# Patient Record
Sex: MALE | Race: WHITE | NOT HISPANIC OR LATINO | Employment: FULL TIME | URBAN - METROPOLITAN AREA
[De-identification: names, ages, dates, MRNs, and addresses within clinical notes are randomized per-mention and may not be internally consistent; named-entity substitution may affect disease eponyms.]

---

## 2020-12-02 NOTE — TELEPHONE ENCOUNTER
Pt called needing a refill on Novolog 100 units/m/L vials use as directed in insulin pump up to a max daily dose of 85 units daily per centricity. Pt last seen 11/26/19 pt next appt 01/20/21

## 2020-12-09 ENCOUNTER — TELEPHONE (OUTPATIENT)
Dept: ENDOCRINOLOGY | Facility: CLINIC | Age: 40
End: 2020-12-09

## 2020-12-09 NOTE — TELEPHONE ENCOUNTER
Pt has 3 days left  He needs a pa for his novolog pt uses [facility] in [place]    He called the pharmacy and they told him if we called it would be approved right away    Please call pt if you have questions

## 2020-12-29 ENCOUNTER — TELEPHONE (OUTPATIENT)
Dept: ENDOCRINOLOGY | Facility: CLINIC | Age: 40
End: 2020-12-29

## 2020-12-29 NOTE — TELEPHONE ENCOUNTER
PT CALLED REQUESTING WE MAIL HIM A LETTER STATING HE IS SEEN BY US FOR DIABETES. PLEASE WRITE LETTER AND MAIL TO PT AT EARLIEST CONVENIENCE. THANK YOU

## 2021-01-06 RX ORDER — LISINOPRIL 10 MG/1
TABLET ORAL
Qty: 90 TABLET | Refills: 3 | Status: SHIPPED | OUTPATIENT
Start: 2021-01-06 | End: 2022-01-03

## 2021-01-20 ENCOUNTER — OFFICE VISIT (OUTPATIENT)
Dept: ENDOCRINOLOGY | Facility: CLINIC | Age: 41
End: 2021-01-20

## 2021-01-20 VITALS
HEIGHT: 73 IN | SYSTOLIC BLOOD PRESSURE: 124 MMHG | DIASTOLIC BLOOD PRESSURE: 74 MMHG | WEIGHT: 183 LBS | BODY MASS INDEX: 24.25 KG/M2

## 2021-01-20 DIAGNOSIS — E10.649 TYPE 1 DIABETES MELLITUS WITH HYPOGLYCEMIA AND WITHOUT COMA (HCC): Primary | Chronic | ICD-10-CM

## 2021-01-20 DIAGNOSIS — Z96.41 PRESENCE OF INSULIN PUMP: ICD-10-CM

## 2021-01-20 DIAGNOSIS — I10 BENIGN ESSENTIAL HYPERTENSION: Chronic | ICD-10-CM

## 2021-01-20 PROCEDURE — 99442 PR PHYS/QHP TELEPHONE EVALUATION 11-20 MIN: CPT | Performed by: PHYSICIAN ASSISTANT

## 2021-01-20 RX ORDER — PROCHLORPERAZINE 25 MG/1
SUPPOSITORY RECTAL
Qty: 9 EACH | Refills: 3 | Status: SHIPPED | OUTPATIENT
Start: 2021-01-20 | End: 2022-01-14

## 2021-01-20 RX ORDER — PROCHLORPERAZINE 25 MG/1
SUPPOSITORY RECTAL
COMMUNITY
End: 2021-01-20 | Stop reason: SDUPTHER

## 2021-01-20 RX ORDER — INSULIN PUMP CONTROLLER
EACH MISCELLANEOUS
COMMUNITY
Start: 2020-11-23 | End: 2021-01-20 | Stop reason: SDUPTHER

## 2021-01-20 RX ORDER — PROCHLORPERAZINE 25 MG/1
1 SUPPOSITORY RECTAL
Qty: 1 EACH | Refills: 3 | Status: SHIPPED | OUTPATIENT
Start: 2021-01-20 | End: 2022-03-21

## 2021-01-20 RX ORDER — FLUTICASONE PROPIONATE 50 MCG
SPRAY, SUSPENSION (ML) NASAL
COMMUNITY

## 2021-01-20 RX ORDER — CETIRIZINE HYDROCHLORIDE 10 MG/1
TABLET ORAL
COMMUNITY

## 2021-01-20 RX ORDER — INSULIN PUMP CONTROLLER
1 EACH MISCELLANEOUS
Qty: 30 EACH | Refills: 3 | Status: SHIPPED | OUTPATIENT
Start: 2021-01-20 | End: 2021-12-07

## 2021-01-20 RX ORDER — INSULIN PUMP CONTROLLER
EACH MISCELLANEOUS
COMMUNITY
End: 2021-01-20

## 2021-01-20 NOTE — PROGRESS NOTES
"     Office Note      Date: 2021  Patient Name: Jonas Santiago  MRN: [id number]  : 1980    Chief Complaint   Patient presents with   • Diabetes     You have chosen to receive care through a telephone visit. Do you consent to use a telephone visit for your medical care today? Yes    History of Present Illness:   Jonas Santiago is a 40 y.o. male who presents today for type 1 diabetes.  He remains on Omnipod Dash insulin pump and using the Dexcom G6.  He is monitoring glucose 288 times per day with CGM.  He denies frequent or severe hypoglycemia.  He reports that blood sugars seem to have been okay overall.  He denies any problems with his feet.  Eye exam due.  He remains on ACE inhibitor.    Subjective        The following portions of the patient's history were reviewed and updated as appropriate: allergies, current medications, past family history, past medical history, past social history, past surgical history and problem list.    Objective     Vitals:    21 1334   BP: 124/74   BP Location: Right arm   Patient Position: Sitting   Cuff Size: Adult   Weight: 83 kg (183 lb)   Height: 185.4 cm (73\")     Body mass index is 24.14 kg/m².    Physical Exam      Current Outpatient Medications   Medication Instructions   • cetirizine (zyrTEC) 10 MG tablet cetirizine 10 mg tablet   Take 1 tablet every day by oral route.   • Continuous Blood Gluc Sensor (Dexcom G6 Sensor) Change sensor every 10 days; E10.9   • Continuous Blood Gluc Transmit (Dexcom G6 Transmitter) misc 1 Device, Does not apply, Every 3 Months, Dx: E10.9   • fluticasone (FLONASE) 50 MCG/ACT nasal spray fluticasone propionate 50 mcg/actuation nasal spray,suspension   Spray 2 sprays every day by intranasal route.   • insulin aspart (NovoLOG) 100 UNIT/ML injection Use as directed in insulin pump daily.  Max daily dose 85 units   • Insulin Disposable Pump (OmniPod Dash 5 Pack Pods) misc 1 Device, Does not apply, Every 3 Days, Dx: E10.9   • lisinopril " (PRINIVIL,ZESTRIL) 10 MG tablet Take 1 tablet daily       Assessment / Plan      Assessment & Plan:  1. Type 1 diabetes mellitus with hypoglycemia and without coma (CMS/[facility])  Reviewed CGM data and discussed with patient.  Sensor average 151 for the past 2 weeks, 77% time in range, <1% low.  Some hyperglycemia in the early morning hours.  Some of these recently have been rebound from mild hypoglycemia around 10 or 11 PM.  Careful with correction boluses at night.  No changes to pump settings at this time.  Lab order mailed - will notify him of results.  - Comprehensive Metabolic Panel; Future  - Lipid Panel; Future  - Hemoglobin A1c; Future  - Microalbumin / Creatinine Urine Ratio - Urine, Clean Catch; Future  - TSH; Future    2. Benign essential hypertension  Home BP monitoring okay.  Continue lisinopril.    3. Presence of insulin pump      20 minutes spent on phone with patient.  Return in about 3 months (around 4/20/2021) for Next scheduled follow up.     CJ Gibbons  01/20/2021

## 2021-03-17 ENCOUNTER — PATIENT MESSAGE (OUTPATIENT)
Dept: ENDOCRINOLOGY | Facility: CLINIC | Age: 41
End: 2021-03-17

## 2021-03-30 DIAGNOSIS — E10.649 TYPE 1 DIABETES MELLITUS WITH HYPOGLYCEMIA AND WITHOUT COMA (HCC): Chronic | ICD-10-CM

## 2021-06-08 ENCOUNTER — OFFICE VISIT (OUTPATIENT)
Dept: ENDOCRINOLOGY | Facility: CLINIC | Age: 41
End: 2021-06-08

## 2021-06-08 VITALS
OXYGEN SATURATION: 99 % | HEIGHT: 73 IN | WEIGHT: 183 LBS | SYSTOLIC BLOOD PRESSURE: 115 MMHG | BODY MASS INDEX: 24.25 KG/M2 | DIASTOLIC BLOOD PRESSURE: 80 MMHG | HEART RATE: 70 BPM

## 2021-06-08 DIAGNOSIS — E10.65 TYPE 1 DIABETES MELLITUS WITH HYPERGLYCEMIA (HCC): Primary | Chronic | ICD-10-CM

## 2021-06-08 DIAGNOSIS — B35.3 TINEA PEDIS, UNSPECIFIED LATERALITY: ICD-10-CM

## 2021-06-08 DIAGNOSIS — Z96.41 PRESENCE OF INSULIN PUMP: ICD-10-CM

## 2021-06-08 DIAGNOSIS — R59.0 LYMPHADENOPATHY OF HEAD AND NECK REGION: ICD-10-CM

## 2021-06-08 LAB
EXPIRATION DATE: NORMAL
HBA1C MFR BLD: 6.8 %
Lab: NORMAL

## 2021-06-08 PROCEDURE — 83036 HEMOGLOBIN GLYCOSYLATED A1C: CPT | Performed by: PHYSICIAN ASSISTANT

## 2021-06-08 PROCEDURE — 99214 OFFICE O/P EST MOD 30 MIN: CPT | Performed by: PHYSICIAN ASSISTANT

## 2021-06-08 PROCEDURE — 95251 CONT GLUC MNTR ANALYSIS I&R: CPT | Performed by: PHYSICIAN ASSISTANT

## 2021-06-08 NOTE — PROGRESS NOTES
"     Office Note      Date: 2021  Patient Name: Jonas Santiago  MRN: [id number]  : 1980    Chief Complaint   Patient presents with   • Diabetes       History of Present Illness:   Jonas Santiago is a 41 y.o. male who presents today for type 1 diabetes.  He remains on OmniPod insulin pump and using the Dexcom G6 CGM.  He is frequently adjusting insulin based on glucose readings.  He notes some hypoglycemia.  He denies any severe hypoglycemia.  Feet- no sores or problems.  Eye exam 2 weeks ago - no retinopathy.  He remains on ACE inhibitor.  Annual labs up to date.  Lipids at goal, renal function and urine protein normal - 2021.  He reports persistent lymph node on right side of neck.  He reports having evaluation with PCP.  He first noticed the enlarged lymph node after first dose of Covid-19 vaccine in January.  Decreased in size, then increased again in February after second dose of vaccine.  He reports that the LN has decreased in size, but he can still feel it.  He reports having normal chest x-ray ordered by PCP.      Subjective      Review of Systems:   Review of Systems   Constitutional: Negative.    Cardiovascular: Negative.    Gastrointestinal: Positive for constipation.   Endocrine: Positive for polyuria.   Hematological: Positive for adenopathy (right side of neck).       The following portions of the patient's history were reviewed and updated as appropriate: allergies, current medications, past family history, past medical history, past social history, past surgical history and problem list.    Objective     Vitals:    21 0917   BP: 115/80   BP Location: Left arm   Patient Position: Sitting   Cuff Size: Adult   Pulse: 70   SpO2: 99%   Weight: 83 kg (183 lb)   Height: 185.4 cm (73\")   PainSc: 0-No pain     Body mass index is 24.14 kg/m².    Physical Exam  Vitals reviewed.   Constitutional:       General: He is not in acute distress.  Neck:      Thyroid: No thyroid mass or thyromegaly. "   Cardiovascular:      Pulses:           Dorsalis pedis pulses are 2+ on the right side and 2+ on the left side.        Posterior tibial pulses are 2+ on the right side and 2+ on the left side.   Musculoskeletal:      Right foot: No deformity.      Left foot: No deformity.   Feet:      Right foot:      Protective Sensation: 10 sites tested. 10 sites sensed.      Skin integrity: Dry skin (right 1st toe, ?tinea pedis) present.      Toenail Condition: Right toenails are normal.      Left foot:      Protective Sensation: 10 sites tested. 10 sites sensed.      Skin integrity: Skin integrity normal.      Toenail Condition: Left toenails are normal.      Comments: Diabetic Foot Exam Performed  Lymphadenopathy:      Cervical: No cervical adenopathy.      Upper Body:      Right upper body: Supraclavicular adenopathy (small shotty node) present.   Neurological:      Mental Status: He is alert and oriented to person, place, and time.   Psychiatric:         Mood and Affect: Affect normal.         HEMOGLOBIN A1C  Lab Results   Component Value Date    HGBA1C 6.8 06/08/2021       Current Outpatient Medications   Medication Instructions   • cetirizine (zyrTEC) 10 MG tablet cetirizine 10 mg tablet   Take 1 tablet every day by oral route.   • Continuous Blood Gluc Sensor (Dexcom G6 Sensor) Change sensor every 10 days; E10.9   • Continuous Blood Gluc Transmit (Dexcom G6 Transmitter) misc 1 Device, Does not apply, Every 3 Months, Dx: E10.9   • fluticasone (FLONASE) 50 MCG/ACT nasal spray fluticasone propionate 50 mcg/actuation nasal spray,suspension   Spray 2 sprays every day by intranasal route.   • insulin aspart (NovoLOG) 100 UNIT/ML injection USE AS DIRECTED IN INSULIN PUMP DAILY MAX DAILY DOSE 85 UNITS   • Insulin Disposable Pump (OmniPod Dash 5 Pack Pods) misc 1 Device, Does not apply, Every 3 Days, Dx: E10.9   • Insulin Infusion Pump device OmniPod Dash: Basal 12 AM 0.85, 4 AM 0.95, 8 AM 0.9, 11 AM 1.05, 3 PM 1.0; carb ratio 12  AM 1:10, ISF 60, target 120 (correct above 150), AIT 3h   • lisinopril (PRINIVIL,ZESTRIL) 10 MG tablet Take 1 tablet daily       Assessment / Plan      Assessment & Plan:  1. Type 1 diabetes mellitus with hyperglycemia (CMS/[facility])  A1c okay.  Reviewed pump/CGM data and discussed with patient.  Sensor glucose average 154 for the past 2 weeks, 72% time in range, 1-2% low.  No changes to pump settings at this time.  - POC Glycosylated Hemoglobin (Hb A1C)    2. Lymphadenopathy of head and neck region  Appears to be reactive lymph node following COVID-19 vaccines.  He will follow up with his PCP if increases in size.    3. Tinea pedis, unspecified laterality  Possible tinea pedis right first toe.  He will use OTC antifungal to see if improves.    4. Presence of insulin pump      Return in about 3 months (around 9/8/2021) for Next scheduled follow up.     CJ Gibbons  Endocrinology  06/08/2021

## 2021-09-10 ENCOUNTER — OFFICE VISIT (OUTPATIENT)
Dept: ENDOCRINOLOGY | Facility: CLINIC | Age: 41
End: 2021-09-10

## 2021-09-10 VITALS
DIASTOLIC BLOOD PRESSURE: 76 MMHG | SYSTOLIC BLOOD PRESSURE: 122 MMHG | HEART RATE: 64 BPM | OXYGEN SATURATION: 98 % | BODY MASS INDEX: 23.77 KG/M2 | WEIGHT: 180.2 LBS

## 2021-09-10 DIAGNOSIS — E10.65 TYPE 1 DIABETES MELLITUS WITH HYPERGLYCEMIA (HCC): Primary | Chronic | ICD-10-CM

## 2021-09-10 DIAGNOSIS — Z96.41 PRESENCE OF INSULIN PUMP: ICD-10-CM

## 2021-09-10 LAB
EXPIRATION DATE: ABNORMAL
EXPIRATION DATE: NORMAL
GLUCOSE BLDC GLUCOMTR-MCNC: 186 MG/DL (ref 70–130)
HBA1C MFR BLD: 6.5 %
Lab: ABNORMAL
Lab: NORMAL

## 2021-09-10 PROCEDURE — 99213 OFFICE O/P EST LOW 20 MIN: CPT | Performed by: PHYSICIAN ASSISTANT

## 2021-09-10 PROCEDURE — 95251 CONT GLUC MNTR ANALYSIS I&R: CPT | Performed by: PHYSICIAN ASSISTANT

## 2021-09-10 PROCEDURE — 83036 HEMOGLOBIN GLYCOSYLATED A1C: CPT | Performed by: PHYSICIAN ASSISTANT

## 2021-09-10 RX ORDER — CETIRIZINE HYDROCHLORIDE 10 MG/1
TABLET ORAL
COMMUNITY
End: 2021-09-10

## 2021-09-10 NOTE — PROGRESS NOTES
Office Note      Date: 09/10/2021  Patient Name: Jonas Santiago  MRN: [id number]  : 1980    Chief Complaint   Patient presents with   • Diabetes       History of Present Illness:   Jonas Santiago is a 41 y.o. male who presents today for follow up on type 1 diabetes, diagnosed in .  He remains on OmniPod insulin pump and using the Dexcom G6 CGM.  He is frequently adjusting insulin based on glucose readings.  He reports that blood sugars have been a little higher recently.  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  He notes some hypoglycemia.  He denies any severe hypoglycemia.  Known diabetic complications: none.  Feet: No sores or other issues.  Foot exam up to date.  Eye exam current (within one year): yes, May 2021. No retinopathy without macular edema.  ACE inhibitor/ARB: No.  Statin: No.    Subjective      Review of Systems:   Review of Systems   Constitutional: Negative.    Cardiovascular: Negative.    Gastrointestinal: Negative.    Endocrine: Negative.        The following portions of the patient's history were reviewed and updated as appropriate: allergies, current medications, past family history, past medical history, past social history, past surgical history and problem list.    Objective     Vitals:    09/10/21 1305   BP: 122/76   BP Location: Right arm   Patient Position: Sitting   Cuff Size: Adult   Pulse: 64   SpO2: 98%   Weight: 81.7 kg (180 lb 3.2 oz)   PainSc: 0-No pain     Body mass index is 23.77 kg/m².    Physical Exam  Vitals reviewed.   Constitutional:       General: He is not in acute distress.  Neurological:      Mental Status: He is alert and oriented to person, place, and time.   Psychiatric:         Mood and Affect: Affect normal.         HEMOGLOBIN A1C  Lab Results   Component Value Date    HGBA1C 6.5 09/10/2021    HGBA1C 6.8 2021     GLUCOSE  Lab Results   Component Value Date    POCGLU 186 (A) 09/10/2021       Current  Outpatient Medications   Medication Instructions   • cetirizine (zyrTEC) 10 MG tablet cetirizine 10 mg tablet   Take 1 tablet every day by oral route.   • Continuous Blood Gluc Sensor (Dexcom G6 Sensor) Change sensor every 10 days; E10.9   • Continuous Blood Gluc Transmit (Dexcom G6 Transmitter) misc 1 Device, Does not apply, Every 3 Months, Dx: E10.9   • fluticasone (FLONASE) 50 MCG/ACT nasal spray fluticasone propionate 50 mcg/actuation nasal spray,suspension   Spray 2 sprays every day by intranasal route.   • insulin aspart (NovoLOG) 100 UNIT/ML injection USE AS DIRECTED IN INSULIN PUMP DAILY MAX DAILY DOSE 85 UNITS   • Insulin Disposable Pump (OmniPod Dash 5 Pack Pods) misc 1 Device, Does not apply, Every 3 Days, Dx: E10.9   • Insulin Infusion Pump device OmniPod Dash: Basal 12 AM 0.85, 4 AM 0.95, 8 AM 0.9, 11 AM 0.95, 3 PM 1.0; carb ratio 12 AM 1:10, ISF 60, target 120 (correct above 150), AIT 3.5h   • lisinopril (PRINIVIL,ZESTRIL) 10 MG tablet Take 1 tablet daily       Assessment / Plan      Assessment & Plan:  1. Type 1 diabetes mellitus with hyperglycemia (CMS/[facility])  A1c at goal.  Reviewed CGM data and discussed with patient.  Sensor glucose average 149 for the past 2 weeks, 74% time in range, 1% low, 2% >250.  Recommend to decrease basal from 11 AM to 3 PM by 0.1 u/hr.  Adjust AIT from 3 hours to 3.5 hours.  - POC Glucose, Blood  - POC Glycosylated Hemoglobin (Hb A1C)    2. Presence of insulin pump      Return in about 3 months (around 12/10/2021) for next scheduled follow up.     CJ Gibbons  Endocrinology  09/10/2021

## 2021-09-22 ENCOUNTER — TELEPHONE (OUTPATIENT)
Dept: ENDOCRINOLOGY | Facility: CLINIC | Age: 41
End: 2021-09-22

## 2021-09-22 NOTE — TELEPHONE ENCOUNTER
Pt needs a pa for his insulin pump he said they have send a PA for it  Pt uses [facility]  [phone]    pts number [phone]

## 2021-09-23 NOTE — TELEPHONE ENCOUNTER
Spoke with patient.  States that his pods for his dash need PA.  Advised that we needed denial from pharmacy in order to proceed with PA.

## 2021-09-27 ENCOUNTER — PRIOR AUTHORIZATION (OUTPATIENT)
Dept: ENDOCRINOLOGY | Facility: CLINIC | Age: 41
End: 2021-09-27

## 2021-09-27 NOTE — TELEPHONE ENCOUNTER
Approvedon September 25  Your PA request has been approved. Additional information will be provided in the approval communication. (Message 1145)  Drug  OmniPod Dash 5 Pack Pods  Form  [facility] Electronic PA Form (2017 NCPDP)   No

## 2021-11-09 ENCOUNTER — TELEPHONE (OUTPATIENT)
Dept: ENDOCRINOLOGY | Facility: CLINIC | Age: 41
End: 2021-11-09

## 2021-11-09 NOTE — TELEPHONE ENCOUNTER
Called [facility] pharmacy regarding PA request for generic Novolog, they ran as brand and it was covered by insurance. SARA

## 2021-12-07 RX ORDER — INSULIN PUMP CONTROLLER
EACH MISCELLANEOUS
Qty: 30 EACH | Refills: 3 | Status: SHIPPED | OUTPATIENT
Start: 2021-12-07 | End: 2022-07-05

## 2022-01-03 RX ORDER — LISINOPRIL 10 MG/1
TABLET ORAL
Qty: 90 TABLET | Refills: 1 | Status: SHIPPED | OUTPATIENT
Start: 2022-01-03 | End: 2022-07-05

## 2022-01-14 RX ORDER — PROCHLORPERAZINE 25 MG/1
SUPPOSITORY RECTAL
Qty: 9 EACH | Refills: 0 | Status: SHIPPED | OUTPATIENT
Start: 2022-01-14 | End: 2022-04-14

## 2022-03-21 RX ORDER — PROCHLORPERAZINE 25 MG/1
SUPPOSITORY RECTAL
Qty: 1 EACH | Refills: 3 | Status: SHIPPED | OUTPATIENT
Start: 2022-03-21 | End: 2023-03-22 | Stop reason: SDUPTHER

## 2022-04-14 RX ORDER — PROCHLORPERAZINE 25 MG/1
SUPPOSITORY RECTAL
Qty: 9 EACH | Refills: 3 | Status: SHIPPED | OUTPATIENT
Start: 2022-04-14 | End: 2023-03-22 | Stop reason: SDUPTHER

## 2022-06-24 RX ORDER — INSULIN ASPART 100 [IU]/ML
INJECTION, SOLUTION INTRAVENOUS; SUBCUTANEOUS
Qty: 30 ML | Refills: 0 | Status: SHIPPED | OUTPATIENT
Start: 2022-06-24 | End: 2022-07-05 | Stop reason: SDUPTHER

## 2022-07-05 ENCOUNTER — OFFICE VISIT (OUTPATIENT)
Dept: ENDOCRINOLOGY | Facility: CLINIC | Age: 42
End: 2022-07-05

## 2022-07-05 VITALS
DIASTOLIC BLOOD PRESSURE: 86 MMHG | HEART RATE: 62 BPM | WEIGHT: 184 LBS | OXYGEN SATURATION: 98 % | SYSTOLIC BLOOD PRESSURE: 120 MMHG | HEIGHT: 72 IN | BODY MASS INDEX: 24.92 KG/M2

## 2022-07-05 DIAGNOSIS — Z96.41 PRESENCE OF INSULIN PUMP: ICD-10-CM

## 2022-07-05 DIAGNOSIS — I10 BENIGN ESSENTIAL HYPERTENSION: Chronic | ICD-10-CM

## 2022-07-05 DIAGNOSIS — E10.65 TYPE 1 DIABETES MELLITUS WITH HYPERGLYCEMIA: Primary | Chronic | ICD-10-CM

## 2022-07-05 LAB
EXPIRATION DATE: ABNORMAL
EXPIRATION DATE: NORMAL
GLUCOSE BLDC GLUCOMTR-MCNC: 193 MG/DL (ref 70–130)
HBA1C MFR BLD: 6.6 %
Lab: ABNORMAL
Lab: NORMAL

## 2022-07-05 PROCEDURE — 95251 CONT GLUC MNTR ANALYSIS I&R: CPT | Performed by: PHYSICIAN ASSISTANT

## 2022-07-05 PROCEDURE — 83036 HEMOGLOBIN GLYCOSYLATED A1C: CPT | Performed by: PHYSICIAN ASSISTANT

## 2022-07-05 PROCEDURE — 99214 OFFICE O/P EST MOD 30 MIN: CPT | Performed by: PHYSICIAN ASSISTANT

## 2022-07-05 RX ORDER — INSULIN ASPART 100 [IU]/ML
INJECTION, SOLUTION INTRAVENOUS; SUBCUTANEOUS
Qty: 30 ML | Refills: 5 | Status: SHIPPED | OUTPATIENT
Start: 2022-07-05 | End: 2023-03-22 | Stop reason: SDUPTHER

## 2022-07-05 RX ORDER — LISINOPRIL 10 MG/1
TABLET ORAL
Qty: 90 TABLET | Refills: 1 | Status: SHIPPED | OUTPATIENT
Start: 2022-07-05 | End: 2022-12-30

## 2022-07-05 NOTE — PROGRESS NOTES
Office Note      Date: 2022  Patient Name: Jonas Santiago  MRN: [id number]  : 1980    Chief Complaint   Patient presents with   • Diabetes   • Hypertension       History of Present Illness:   Jonas Santiago is a 42 y.o. male who presents today for follow up on type 1 diabetes.  Diabetes was diagnosed in .  Known diabetic complications: none.  He remains on OmniPod insulin pump.  He is using the Dexcom G6 CGM.  He is monitoring glucose ~288 times per day using CGM.  He is frequently adjusting insulin based on glucose readings and carbohydrate counting.  He reports some hypoglycemia.  He denies any severe hypoglycemia.  Current exercise: Intermittent.  Feet: No sores or other issues.   Eye exam current (within one year): yes, 6/15/2022. No retinopathy.  ACE inhibitor/ARB: Lisinopril.  Statin: No.  He reports having Covid-19 several months ago.  He reports severe sore throat.  He took paxlovid.  He reports that symptoms improved, then he developed cough and cold symptoms for a few days.  He reports seeing urologist, Dr. Torres, in [place].  He reports undergoing evaluation for testicular pain.  He reports being told that calcifications seen and will follow annually.      Subjective      Review of Systems:   Review of Systems   Constitutional: Negative.    Cardiovascular: Negative.    Gastrointestinal: Negative.    Endocrine: Negative.        Past Medical History:   Diagnosis Date   • Benign essential hypertension    • Cervical disc herniation    • Pneumonia    • Type 1 diabetes mellitus (HCC)       Past Surgical History:   Procedure Laterality Date   • MOUTH SURGERY         The following portions of the patient's history were reviewed and updated as appropriate: allergies, current medications, past family history, past medical history, past social history, past surgical history and problem list.    Objective     Vitals:    22 1542   BP: 120/86   BP Location: Left arm   Patient Position:  "Sitting   Cuff Size: Adult   Pulse: 62   SpO2: 98%   Weight: 83.5 kg (184 lb)   Height: 182.9 cm (72\")   PainSc: 0-No pain   Body mass index is 24.95 kg/m².    Physical Exam  Vitals reviewed.   Constitutional:       General: He is not in acute distress.  Neurological:      Mental Status: He is alert and oriented to person, place, and time.   Psychiatric:         Mood and Affect: Affect normal.         HEMOGLOBIN A1C  Lab Results   Component Value Date    HGBA1C 6.6 07/05/2022    HGBA1C 6.5 09/10/2021    HGBA1C 6.8 06/08/2021     GLUCOSE  Lab Results   Component Value Date    POCGLU 193 (A) 07/05/2022       Current Outpatient Medications   Medication Instructions   • cetirizine (zyrTEC) 10 MG tablet cetirizine 10 mg tablet   Take 1 tablet every day by oral route.   • Continuous Blood Gluc Sensor (Dexcom G6 Sensor) USE ONE SENSOR FOR 10 DAYS THEN CHANGE   • Continuous Blood Gluc Transmit (Dexcom G6 Transmitter) misc USE ONE DEVICE EVERY THREE MONTHS   • fluticasone (FLONASE) 50 MCG/ACT nasal spray fluticasone propionate 50 mcg/actuation nasal spray,suspension   Spray 2 sprays every day by intranasal route.   • Insulin Aspart (NovoLOG) 100 UNIT/ML injection Use as directed in insulin pump, max dose 85 units daily   • Insulin Disposable Pump (Omnipod 5 G6 Intro, Gen 5,) kit 1 kit, Does not apply, Take As Directed, Change pod every 72 hours.   • Insulin Infusion Pump device OmniPod: Basal 12 AM 0.85, 4 AM 0.95, 8 AM 0.9, 11 AM 0.95, 3 PM 1.0; carb ratio 12 AM 1:10, ISF 60, target 120 (correct above 150), AIT 3.5h   • lisinopril (PRINIVIL,ZESTRIL) 10 MG tablet TAKE ONE TABLET BY MOUTH DAILY       Assessment / Plan      Assessment & Plan:  1. Type 1 diabetes mellitus with hyperglycemia (HCC)  A1c at goal.  Reviewed pump/CGM data and discussed with patient.  Sensor glucose average 158 for the past 2 weeks, 71% time in range , <1% low, 1% >250.  He was mainly doing manual boluses.  We discussed changing to new hybrid " closed-loop system - OmniPod 5.  He would like to start using this.  Advised of training options.  Rx sent to pharmacy.  - POC Glucose, Blood  - POC Glycosylated Hemoglobin (Hb A1C)  - Insulin Aspart (NovoLOG) 100 UNIT/ML injection; Use as directed in insulin pump, max dose 85 units daily  Dispense: 30 mL; Refill: 5  - Insulin Disposable Pump (Omnipod 5 G6 Intro, Gen 5,) kit; 1 kit Take As Directed. Change pod every 72 hours.  Dispense: 1 kit; Refill: 0    2. Benign essential hypertension  BP borderline.  Continue lisinopril.  Will continue to monitor.    3. Presence of insulin pump        Return in about 3 months (around 10/5/2022) for fasting follow up (if not done prior to visit with PCP). He was advised to contact the office with any interval questions or concerns.    CJ Gibbons  Endocrinology  07/05/2022

## 2022-07-05 NOTE — TELEPHONE ENCOUNTER
Last office visit 09/10/2021.      Please advise.  Patient scheduled for follow up today, 07/05/2022.

## 2022-07-06 RX ORDER — INSULIN PMP CART,AUT,G6/7,CNTR
1 EACH SUBCUTANEOUS TAKE AS DIRECTED
Qty: 1 KIT | Refills: 0 | Status: SHIPPED | OUTPATIENT
Start: 2022-07-06 | End: 2022-10-19

## 2022-07-12 ENCOUNTER — TELEPHONE (OUTPATIENT)
Dept: ENDOCRINOLOGY | Facility: CLINIC | Age: 42
End: 2022-07-12

## 2022-07-12 RX ORDER — INSULIN PMP CART,AUT,G6/7,CNTR
1 EACH SUBCUTANEOUS
Qty: 30 EACH | Refills: 3 | Status: SHIPPED | OUTPATIENT
Start: 2022-07-12

## 2022-07-12 NOTE — TELEPHONE ENCOUNTER
[facility] [phone] Ref #[id number] has question on OmniPod Kit needs clarification on drug name and directions please???? Call them ask for [place].  Thank you

## 2022-08-05 DIAGNOSIS — E10.65 TYPE 1 DIABETES MELLITUS WITH HYPERGLYCEMIA: Chronic | ICD-10-CM

## 2022-08-08 RX ORDER — INSULIN PMP CART,AUT,G6/7,CNTR
EACH SUBCUTANEOUS
Qty: 1 KIT | Refills: 0 | OUTPATIENT
Start: 2022-08-08

## 2022-09-20 DIAGNOSIS — E10.65 TYPE 1 DIABETES MELLITUS WITH HYPERGLYCEMIA: Primary | ICD-10-CM

## 2022-09-26 ENCOUNTER — TELEPHONE (OUTPATIENT)
Dept: DIABETES SERVICES | Facility: HOSPITAL | Age: 42
End: 2022-09-26

## 2022-09-26 NOTE — TELEPHONE ENCOUNTER
Diabetes education-received phone call from pt to reschedule his appt for omnipod 5 training, he cannot make this Thursday 9/29 appt. He reports he is upgrading from dash to OP5 and would prefer virtual training if available. Pt gave permission for educator to reach out to omnipod representative to request virtual training info. Email sent to Saud Dutta with [employer] regarding pt's request for virtual training. Appt cancelled, pt will callback to reschedule if virtual training is not an option.     Lindsey Greenwood RN,[facility]

## 2022-09-29 ENCOUNTER — APPOINTMENT (OUTPATIENT)
Dept: DIABETES SERVICES | Facility: HOSPITAL | Age: 42
End: 2022-09-29

## 2022-10-05 ENCOUNTER — PRIOR AUTHORIZATION (OUTPATIENT)
Dept: ENDOCRINOLOGY | Facility: CLINIC | Age: 42
End: 2022-10-05

## 2022-10-05 NOTE — TELEPHONE ENCOUNTER
Approvedtoday  Your PA request has been approved. Additional information will be provided in the approval communication. (Message 1145)  Drug  Dexcom G6 Sensor  Form  [employer] Electronic PA Form (2017 NCPDP)

## 2022-10-19 ENCOUNTER — LAB (OUTPATIENT)
Dept: LAB | Facility: HOSPITAL | Age: 42
End: 2022-10-19

## 2022-10-19 ENCOUNTER — OFFICE VISIT (OUTPATIENT)
Dept: ENDOCRINOLOGY | Facility: CLINIC | Age: 42
End: 2022-10-19

## 2022-10-19 VITALS
BODY MASS INDEX: 25.19 KG/M2 | HEIGHT: 72 IN | DIASTOLIC BLOOD PRESSURE: 68 MMHG | SYSTOLIC BLOOD PRESSURE: 102 MMHG | WEIGHT: 186 LBS | OXYGEN SATURATION: 98 % | HEART RATE: 70 BPM

## 2022-10-19 DIAGNOSIS — E10.65 TYPE 1 DIABETES MELLITUS WITH HYPERGLYCEMIA: Chronic | ICD-10-CM

## 2022-10-19 DIAGNOSIS — E10.65 TYPE 1 DIABETES MELLITUS WITH HYPERGLYCEMIA: Primary | Chronic | ICD-10-CM

## 2022-10-19 DIAGNOSIS — I10 BENIGN ESSENTIAL HYPERTENSION: Chronic | ICD-10-CM

## 2022-10-19 DIAGNOSIS — Z96.41 PRESENCE OF INSULIN PUMP: ICD-10-CM

## 2022-10-19 LAB
ALBUMIN SERPL-MCNC: 4.3 G/DL (ref 3.5–5.2)
ALBUMIN UR-MCNC: 1.8 MG/DL
ALBUMIN/GLOB SERPL: 2 G/DL
ALP SERPL-CCNC: 78 U/L (ref 39–117)
ALT SERPL W P-5'-P-CCNC: 17 U/L (ref 1–41)
ANION GAP SERPL CALCULATED.3IONS-SCNC: 12 MMOL/L (ref 5–15)
AST SERPL-CCNC: 19 U/L (ref 1–40)
BILIRUB SERPL-MCNC: 0.5 MG/DL (ref 0–1.2)
BUN SERPL-MCNC: 11 MG/DL (ref 6–20)
BUN/CREAT SERPL: 13.8 (ref 7–25)
CALCIUM SPEC-SCNC: 9.6 MG/DL (ref 8.6–10.5)
CHLORIDE SERPL-SCNC: 103 MMOL/L (ref 98–107)
CHOLEST SERPL-MCNC: 181 MG/DL (ref 0–200)
CO2 SERPL-SCNC: 26 MMOL/L (ref 22–29)
CREAT SERPL-MCNC: 0.8 MG/DL (ref 0.76–1.27)
CREAT UR-MCNC: 142.9 MG/DL
EGFRCR SERPLBLD CKD-EPI 2021: 114.7 ML/MIN/1.73
EXPIRATION DATE: NORMAL
EXPIRATION DATE: NORMAL
GLOBULIN UR ELPH-MCNC: 2.1 GM/DL
GLUCOSE BLDC GLUCOMTR-MCNC: 86 MG/DL (ref 70–130)
GLUCOSE SERPL-MCNC: 105 MG/DL (ref 65–99)
HBA1C MFR BLD: 6.8 %
HDLC SERPL-MCNC: 83 MG/DL (ref 40–60)
LDLC SERPL CALC-MCNC: 87 MG/DL (ref 0–100)
LDLC/HDLC SERPL: 1.04 {RATIO}
Lab: NORMAL
Lab: NORMAL
MICROALBUMIN/CREAT UR: 12.6 MG/G
POTASSIUM SERPL-SCNC: 4 MMOL/L (ref 3.5–5.2)
PROT SERPL-MCNC: 6.4 G/DL (ref 6–8.5)
SODIUM SERPL-SCNC: 141 MMOL/L (ref 136–145)
TRIGL SERPL-MCNC: 59 MG/DL (ref 0–150)
TSH SERPL DL<=0.05 MIU/L-ACNC: 1.3 UIU/ML (ref 0.27–4.2)
VLDLC SERPL-MCNC: 11 MG/DL (ref 5–40)

## 2022-10-19 PROCEDURE — 99214 OFFICE O/P EST MOD 30 MIN: CPT | Performed by: PHYSICIAN ASSISTANT

## 2022-10-19 PROCEDURE — 82043 UR ALBUMIN QUANTITATIVE: CPT

## 2022-10-19 PROCEDURE — 84443 ASSAY THYROID STIM HORMONE: CPT

## 2022-10-19 PROCEDURE — 80053 COMPREHEN METABOLIC PANEL: CPT

## 2022-10-19 PROCEDURE — 95251 CONT GLUC MNTR ANALYSIS I&R: CPT | Performed by: PHYSICIAN ASSISTANT

## 2022-10-19 PROCEDURE — 83036 HEMOGLOBIN GLYCOSYLATED A1C: CPT | Performed by: PHYSICIAN ASSISTANT

## 2022-10-19 PROCEDURE — 82570 ASSAY OF URINE CREATININE: CPT

## 2022-10-19 PROCEDURE — 80061 LIPID PANEL: CPT

## 2022-10-19 NOTE — PROGRESS NOTES
"     Office Note      Date: 10/19/2022  Patient Name: Jonas Santiago  MRN: [id number]  : 1980    Chief Complaint   Patient presents with   • Diabetes       History of Present Illness  Jonas Santiago is a 42 y.o. male who presents today for follow up on type 1 diabetes.   Diabetes was diagnosed in .  Known diabetic complications: none.  He remains on OmniPod insulin pump.  He is using the Dexcom G6 CGM.  He is monitoring glucose ~288 times per day using CGM.  He changed to the new OmniPod 5 automated insulin delivery system several weeks ago.  He reports that he does like the new system.  He reports less hypoglycemia.  He denies any severe hypoglycemia.  He was on vacation last week to [place].  Feet: No sores or other problems.  Foot exam today.  Last eye exam: 6/15/2022. No retinopathy.  ACE inhibitor/ARB: Lisinopril.  Statin: No.      Review of Systems   Constitutional: Negative.    Cardiovascular: Negative.    Gastrointestinal: Negative.    Endocrine: Negative.      Past Medical History:   Diagnosis Date   • Benign essential hypertension    • Cervical disc herniation    • Pneumonia    • Type 1 diabetes mellitus (HCC)       Past Surgical History:   Procedure Laterality Date   • MOUTH SURGERY       The following portions of the patient's history were reviewed and updated as appropriate: allergies, current medications, past family history, past medical history, past social history, past surgical history and problem list.    Vitals:  /68   Pulse 70   Ht 182.9 cm (72\")   Wt 84.4 kg (186 lb)   SpO2 98%   BMI 25.23 kg/m²     Physical Exam  Vitals reviewed.   Constitutional:       General: He is not in acute distress.  Cardiovascular:      Pulses:           Dorsalis pedis pulses are 2+ on the right side and 2+ on the left side.        Posterior tibial pulses are 2+ on the right side and 2+ on the left side.   Musculoskeletal:      Right foot: No deformity.      Left foot: No deformity.   Feet: "      Right foot:      Protective Sensation: 8 sites tested. 8 sites sensed.      Skin integrity: Dry skin present.      Left foot:      Protective Sensation: 8 sites tested. 8 sites sensed.      Skin integrity: Dry skin present.      Comments: Diabetic Foot Exam Performed and Monofilament Test Performed  Neurological:      Mental Status: He is alert and oriented to person, place, and time.   Psychiatric:         Mood and Affect: Affect normal.       Labs/Imaging    Hemoglobin A1c  Lab Results   Component Value Date    HGBA1C 6.8 10/19/2022    HGBA1C 6.6 07/05/2022    HGBA1C 6.5 09/10/2021     Glucose  Lab Results   Component Value Date    POCGLU 86 10/19/2022       Current Outpatient Medications   Medication Instructions   • cetirizine (zyrTEC) 10 MG tablet cetirizine 10 mg tablet   Take 1 tablet every day by oral route.   • Continuous Blood Gluc Sensor (Dexcom G6 Sensor) USE ONE SENSOR FOR 10 DAYS THEN CHANGE   • Continuous Blood Gluc Transmit (Dexcom G6 Transmitter) misc USE ONE DEVICE EVERY THREE MONTHS   • fluticasone (FLONASE) 50 MCG/ACT nasal spray fluticasone propionate 50 mcg/actuation nasal spray,suspension   Spray 2 sprays every day by intranasal route.   • Insulin Aspart (NovoLOG) 100 UNIT/ML injection Use as directed in insulin pump, max dose 85 units daily   • Insulin Disposable Pump (Omnipod 5 G6 Pod, Gen 5,) misc 1 each, Does not apply, Every 3 Days   • Insulin Infusion Pump device OmniPod: Basal 12 AM 0.8, 4 AM 0.9, 8 AM 0.85, 11 AM 0.85, 3 PM 0.9; carb ratio 12 AM 1:10, ISF 60, target 110 (correct above 150), AIT 3.5h   • lisinopril (PRINIVIL,ZESTRIL) 10 MG tablet TAKE ONE TABLET BY MOUTH DAILY       Assessment & Plan  1. Type 1 diabetes mellitus with hyperglycemia (HCC)  A1c at goal.  He has been using the OmniPod 5 for several weeks now.  Reviewed pump/CGM data and discussed with patient.  Sensor glucose average 160 for the past 2 weeks, 70% time in range , 0% low 54-69, 0% very low <54, 6%  high >250.  Recommend to decrease basals by 0.05-0.1 u/hr as listed in meds.  Recommend to bolus before meals.  Reminded of activity mode (temp target increase).  We will make further pump adjustments as needed.  Fasting labs today.  - POC Glucose, Blood  - POC Glycosylated Hemoglobin (Hb A1C)  - Comprehensive Metabolic Panel; Future  - Lipid Panel; Future  - Microalbumin / Creatinine Urine Ratio - Urine, Clean Catch; Future  - TSH; Future    2. Benign essential hypertension  BP controlled.  Continue lisinopril.    3. Presence of insulin pump    Will notify him of pending lab results from today.     Return in about 3 months (around 1/19/2023) for recheck with A1c. He was advised to contact the office with any interval questions or concerns.    CJ Gibbons  Endocrinology  10/19/2022

## 2022-12-30 RX ORDER — LISINOPRIL 10 MG/1
TABLET ORAL
Qty: 90 TABLET | Refills: 1 | Status: SHIPPED | OUTPATIENT
Start: 2022-12-30

## 2023-03-22 ENCOUNTER — OFFICE VISIT (OUTPATIENT)
Dept: ENDOCRINOLOGY | Facility: CLINIC | Age: 43
End: 2023-03-22
Payer: COMMERCIAL

## 2023-03-22 VITALS
HEART RATE: 84 BPM | DIASTOLIC BLOOD PRESSURE: 66 MMHG | OXYGEN SATURATION: 99 % | BODY MASS INDEX: 25.5 KG/M2 | SYSTOLIC BLOOD PRESSURE: 120 MMHG | WEIGHT: 188 LBS

## 2023-03-22 DIAGNOSIS — E10.65 TYPE 1 DIABETES MELLITUS WITH HYPERGLYCEMIA: Primary | Chronic | ICD-10-CM

## 2023-03-22 DIAGNOSIS — I10 BENIGN ESSENTIAL HYPERTENSION: Chronic | ICD-10-CM

## 2023-03-22 DIAGNOSIS — Z96.41 PRESENCE OF INSULIN PUMP: ICD-10-CM

## 2023-03-22 LAB
EXPIRATION DATE: ABNORMAL
EXPIRATION DATE: NORMAL
GLUCOSE BLDC GLUCOMTR-MCNC: 134 MG/DL (ref 70–130)
HBA1C MFR BLD: 6.3 %
Lab: ABNORMAL
Lab: NORMAL

## 2023-03-22 PROCEDURE — 95251 CONT GLUC MNTR ANALYSIS I&R: CPT | Performed by: PHYSICIAN ASSISTANT

## 2023-03-22 PROCEDURE — 99214 OFFICE O/P EST MOD 30 MIN: CPT | Performed by: PHYSICIAN ASSISTANT

## 2023-03-22 PROCEDURE — 83036 HEMOGLOBIN GLYCOSYLATED A1C: CPT | Performed by: PHYSICIAN ASSISTANT

## 2023-03-22 RX ORDER — PROCHLORPERAZINE 25 MG/1
1 SUPPOSITORY RECTAL
Qty: 1 EACH | Refills: 3 | Status: SHIPPED | OUTPATIENT
Start: 2023-03-22

## 2023-03-22 RX ORDER — INSULIN ASPART 100 [IU]/ML
INJECTION, SOLUTION INTRAVENOUS; SUBCUTANEOUS
Qty: 30 ML | Refills: 5 | Status: SHIPPED | OUTPATIENT
Start: 2023-03-22

## 2023-03-22 RX ORDER — PROCHLORPERAZINE 25 MG/1
SUPPOSITORY RECTAL
Qty: 9 EACH | Refills: 3 | Status: SHIPPED | OUTPATIENT
Start: 2023-03-22

## 2023-03-22 NOTE — PROGRESS NOTES
Office Note      Date: 2023  Patient Name: Jonas Santiago  MRN: [id number]  : 1980    Chief Complaint   Patient presents with   • Diabetes       History of Present Illness  Jonas Santiago is a 42 y.o. male who presents today for follow up on type 1 diabetes.   Diabetes was diagnosed in .  Known diabetic complications: none.  He remains on the OmniPod 5 pump with automated insulin delivery system using Dexcom G6 CGM.  He is monitoring glucose ~288 times per day using CGM.  He reports some hypoglycemia.  He denies any severe hypoglycemia.  He is working from home now.  He reports walking 30-45 minutes most days at lunchtime.  Spins several days per week in the evenings.  Feet: No sores or problems.  Foot exam up to date.  Last eye exam: 6/15/2022. No retinopathy.  ACE inhibitor/ARB: Lisinopril.  Statin: No.    Review of systems  As noted in HPI.    Past Medical History:   Diagnosis Date   • Benign essential hypertension    • Cervical disc herniation    • Pneumonia    • Type 1 diabetes mellitus       Past Surgical History:   Procedure Laterality Date   • MOUTH SURGERY       The following portions of the patient's history were reviewed and updated as appropriate: allergies, current medications, past family history, past medical history, past social history, past surgical history and problem list.    Vitals:  /66   Pulse 84   Wt 85.3 kg (188 lb)   SpO2 99%   BMI 25.50 kg/m²     Physical Exam  Vitals reviewed.   Constitutional:       General: He is not in acute distress.  Neurological:      Mental Status: He is alert and oriented to person, place, and time.   Psychiatric:         Mood and Affect: Affect normal.       Labs/Imaging    Hemoglobin A1c  Lab Results   Component Value Date    HGBA1C 6.3 2023    HGBA1C 6.8 10/19/2022    HGBA1C 6.6 2022     Glucose  Lab Results   Component Value Date    POCGLU 134 (A) 2023     CMP  Lab Results   Component Value Date    GLUCOSE  105 (H) 10/19/2022    BUN 11 10/19/2022    CREATININE 0.80 10/19/2022    EGFR 114.7 10/19/2022    BCR 13.8 10/19/2022     10/19/2022    K 4.0 10/19/2022    CO2 26.0 10/19/2022    CALCIUM 9.6 10/19/2022    ALBUMIN 4.30 10/19/2022    BILITOT 0.5 10/19/2022    ALKPHOS 78 10/19/2022    AST 19 10/19/2022    ALT 17 10/19/2022     Lipid panel  Lab Results   Component Value Date    CHOL 181 10/19/2022    TRIG 59 10/19/2022    HDL 83 (H) 10/19/2022    LDL 87 10/19/2022     Urine microalbumin/creatinine ratio  Lab Results   Component Value Date    MALBCRERATIO 12.6 10/19/2022     Thyroid  Lab Results   Component Value Date    TSH 1.300 10/19/2022       Current Outpatient Medications   Medication Instructions   • cetirizine (zyrTEC) 10 MG tablet cetirizine 10 mg tablet   Take 1 tablet every day by oral route.   • Continuous Blood Gluc Sensor (Dexcom G6 Sensor) Use 1 sensor for 10 days   • Continuous Blood Gluc Transmit (Dexcom G6 Transmitter) misc 1 each, Does not apply, Every 3 Months   • fluticasone (FLONASE) 50 MCG/ACT nasal spray fluticasone propionate 50 mcg/actuation nasal spray,suspension   Spray 2 sprays every day by intranasal route.   • Insulin Aspart (NovoLOG) 100 UNIT/ML injection Use as directed in insulin pump, max dose 85 units daily   • Insulin Disposable Pump (Omnipod 5 G6 Pod, Gen 5,) misc 1 each, Does not apply, Every 3 Days   • lisinopril (PRINIVIL,ZESTRIL) 10 MG tablet TAKE ONE TABLET BY MOUTH DAILY       Assessment & Plan  1. Type 1 diabetes mellitus with hyperglycemia  Diabetes has improved.  A1c at goal 6.3% today.  Reviewed pump/CGM data and discussed with patient.  Sensor glucose average 154 for the past 2 weeks, 74% time in range , 1% low 54-69, 0% very low <54, 2% high >250.  Pattern of postprandial hypoglycemia, then rebound hyperglycemia.  Adjust carb ratio to 1:11.  Try to bolus 10 minutes before meals.  He is decreasing boluses prior to physical activity.  Labs up to date.  - POC  Glucose, Blood  - POC Glycosylated Hemoglobin (Hb A1C)  - Insulin Aspart (NovoLOG) 100 UNIT/ML injection; Use as directed in insulin pump, max dose 85 units daily  Dispense: 30 mL; Refill: 5  - Continuous Blood Gluc Sensor (Dexcom G6 Sensor); Use 1 sensor for 10 days  Dispense: 9 each; Refill: 3  - Continuous Blood Gluc Transmit (Dexcom G6 Transmitter) misc; 1 each Every 3 (Three) Months.  Dispense: 1 each; Refill: 3    2. Benign essential hypertension  BP well controlled.  Continue lisinopril.    3. Presence of insulin pump      Return in about 3 months (around 6/22/2023) for next scheduled follow up. He was advised to contact the office with any interval questions or concerns.    CJ Gibbons  Endocrinology  03/22/2023

## 2023-03-23 DIAGNOSIS — E10.65 TYPE 1 DIABETES MELLITUS WITH HYPERGLYCEMIA: Chronic | ICD-10-CM

## 2023-03-23 RX ORDER — PROCHLORPERAZINE 25 MG/1
SUPPOSITORY RECTAL
Qty: 1 EACH | Refills: 3 | OUTPATIENT
Start: 2023-03-23

## 2023-05-23 RX ORDER — INSULIN PMP CART,AUT,G6/7,CNTR
EACH SUBCUTANEOUS
Qty: 30 EACH | Refills: 3 | Status: SHIPPED | OUTPATIENT
Start: 2023-05-23

## 2023-05-23 NOTE — TELEPHONE ENCOUNTER
Rx Refill Note  Requested Prescriptions     Pending Prescriptions Disp Refills   • Insulin Disposable Pump (Omnipod 5 G6 Pod, Gen 5,) misc [Pharmacy Med Name: OMNIPOD 5 G6 MIS PODS] 30 each 3     Sig: USE 1 POD EVERY 3 DAYS      Last office visit with prescribing clinician: 3/22/2023   Last telemedicine visit with prescribing clinician: Visit date not found   Next office visit with prescribing clinician: 6/28/2023                           Courtney Rey CMA  05/23/23, 16:27 EDT

## 2023-09-19 ENCOUNTER — OFFICE VISIT (OUTPATIENT)
Dept: ENDOCRINOLOGY | Facility: CLINIC | Age: 43
End: 2023-09-19
Payer: COMMERCIAL

## 2023-09-19 VITALS
HEIGHT: 72 IN | OXYGEN SATURATION: 98 % | WEIGHT: 184.6 LBS | HEART RATE: 64 BPM | DIASTOLIC BLOOD PRESSURE: 76 MMHG | BODY MASS INDEX: 25 KG/M2 | SYSTOLIC BLOOD PRESSURE: 114 MMHG

## 2023-09-19 DIAGNOSIS — Z96.41 PRESENCE OF INSULIN PUMP: ICD-10-CM

## 2023-09-19 DIAGNOSIS — I10 BENIGN ESSENTIAL HYPERTENSION: ICD-10-CM

## 2023-09-19 DIAGNOSIS — E10.65 TYPE 1 DIABETES MELLITUS WITH HYPERGLYCEMIA: Primary | ICD-10-CM

## 2023-09-19 LAB
EXPIRATION DATE: ABNORMAL
EXPIRATION DATE: NORMAL
GLUCOSE BLDC GLUCOMTR-MCNC: 144 MG/DL (ref 70–130)
HBA1C MFR BLD: 6.4 %
Lab: ABNORMAL
Lab: NORMAL

## 2023-09-19 PROCEDURE — 99214 OFFICE O/P EST MOD 30 MIN: CPT | Performed by: PHYSICIAN ASSISTANT

## 2023-09-19 PROCEDURE — 95251 CONT GLUC MNTR ANALYSIS I&R: CPT | Performed by: PHYSICIAN ASSISTANT

## 2023-09-19 PROCEDURE — 83036 HEMOGLOBIN GLYCOSYLATED A1C: CPT | Performed by: PHYSICIAN ASSISTANT

## 2023-09-19 RX ORDER — LISINOPRIL 10 MG/1
10 TABLET ORAL DAILY
Qty: 90 TABLET | Refills: 1 | Status: SHIPPED | OUTPATIENT
Start: 2023-09-19

## 2023-09-19 NOTE — PROGRESS NOTES
"     Office Note      Date: 2023  Patient Name: Jonas Santiago  MRN: [id number]  : 1980    Chief Complaint   Patient presents with    Diabetes     Nat Glasgow PA Type 1 diabetes mellitus with hyperglycemia         History of Present Illness:   Jonas Santiago is a 43 y.o. male who presents for follow-up for type 1 diabetes diagnosed in .  He remains on the OmniPod 5 insulin pump with Dexcom G6 continuous glucose monitor.  He reports overall things seem to be going well.  He reports typically if he has any trouble with elevated blood sugar readings he knows he needs to change his pod.  He reports this does not happen regularly.  He reports he had a career change and is now working from home.  He is less active but this seems to be less stressful and he is able to monitor his blood sugar readings more closely.  He reports he is due for his annual eye exam and will get this scheduled.  He he denies any trouble with his feet today.  His last foot exam was October of last year.  He had fasting labs at this time as well.      Subjective     Review of Systems:   Review of Systems   Constitutional: Negative.    Cardiovascular: Negative.    Gastrointestinal: Negative.    Endocrine: Negative.    Neurological: Negative.      The following portions of the patient's history were reviewed and updated as appropriate: allergies, current medications, past family history, past medical history, past social history, past surgical history, and problem list.    Objective     Vitals:    23 1407   BP: 114/76   BP Location: Left arm   Patient Position: Sitting   Cuff Size: Adult   Pulse: 64   SpO2: 98%   Weight: 83.7 kg (184 lb 9.6 oz)   Height: 182.9 cm (72\")     Body mass index is 25.04 kg/m².    Physical Exam  Vitals reviewed.   Constitutional:       General: He is not in acute distress.     Appearance: Normal appearance.   Cardiovascular:      Pulses:           Dorsalis pedis pulses are 2+ on the right " side and 2+ on the left side.        Posterior tibial pulses are 2+ on the right side and 2+ on the left side.   Musculoskeletal:      Right foot: No deformity.      Left foot: No deformity.   Feet:      Right foot:      Protective Sensation: 5 sites tested.  5 sites sensed.      Skin integrity: Dry skin present.      Toenail Condition: Right toenails are normal.      Left foot:      Protective Sensation: 5 sites tested.  5 sites sensed.      Skin integrity: Dry skin present.      Toenail Condition: Left toenails are normal.      Comments: Diabetic Foot Exam Performed and Monofilament Test Performed      Neurological:      Mental Status: He is alert.       HEMOGLOBIN A1C  Lab Results   Component Value Date    HGBA1C 6.4 09/19/2023       GLUCOSE  Glucose   Date Value Ref Range Status   09/19/2023 144 (A) 70 - 130 mg/dL Final       CMP  Lab Results   Component Value Date    GLUCOSE 105 (H) 10/19/2022    BUN 11 10/19/2022    CREATININE 0.80 10/19/2022    BCR 13.8 10/19/2022    K 4.0 10/19/2022    CO2 26.0 10/19/2022    CALCIUM 9.6 10/19/2022    AST 19 10/19/2022    ALT 17 10/19/2022       LIPID PANEL  Lab Results   Component Value Date    CHOL 181 10/19/2022    TRIG 59 10/19/2022    HDL 83 (H) 10/19/2022    LDL 87 10/19/2022       URINE MICROALBUMIN/CREATININE RATIO  Microalbumin/Creatinine Ratio   Date Value Ref Range Status   10/19/2022 12.6 mg/g Final       TSH  Lab Results   Component Value Date    TSH 1.300 10/19/2022       Assessment / Plan      Assessment & Plan:  1. Type 1 diabetes mellitus with hyperglycemia  His hemoglobin A1c today is 6.4%.  I reviewed his OmniPod insulin pump download with continuous glucose monitor readings and overall his blood sugars look good.  His blood sugars are 79% in target range with 1% low 0% very low, 17% high and 3% very high.  We did not make any changes in his pump settings today.  He will reach out to me with blood sugar readings if needed.  I gave him a Tresiba pen to have  on hand in case of pump failure.  He will take 20 units every 24 hours.  His blood pressure is excellent today.  His weight is down 4 pounds since his appointment in March.  I encouraged continued healthy eating habits and physical activity as tolerated.  His foot exam was okay today.  We we will plan to do fasting labs next visit for monitoring.  - POC Glycosylated Hemoglobin (Hb A1C)  - POC Glucose, Blood    2. Benign essential hypertension  His blood pressure is excellent today.  He will continue his lisinopril.  I refilled this prescription today.    3. Presence of insulin pump  We discussed considering using other pump sites if he continues to have trouble with elevated blood sugar readings and having to change pods.    Return in about 3 months (around 12/19/2023) for Recheck 3-4 mos, fasting.     This note was dictated using Dragon voice recognition.    Shiloh Childers PA-C  09/19/2023

## 2024-01-30 ENCOUNTER — OFFICE VISIT (OUTPATIENT)
Dept: ENDOCRINOLOGY | Facility: CLINIC | Age: 44
End: 2024-01-30
Payer: COMMERCIAL

## 2024-01-30 VITALS
DIASTOLIC BLOOD PRESSURE: 74 MMHG | HEIGHT: 73 IN | BODY MASS INDEX: 25.05 KG/M2 | OXYGEN SATURATION: 98 % | WEIGHT: 189 LBS | HEART RATE: 70 BPM | SYSTOLIC BLOOD PRESSURE: 112 MMHG

## 2024-01-30 DIAGNOSIS — E10.65 TYPE 1 DIABETES MELLITUS WITH HYPERGLYCEMIA: Primary | ICD-10-CM

## 2024-01-30 DIAGNOSIS — I10 BENIGN ESSENTIAL HYPERTENSION: ICD-10-CM

## 2024-01-30 DIAGNOSIS — Z96.41 PRESENCE OF INSULIN PUMP: ICD-10-CM

## 2024-01-30 LAB
ALBUMIN SERPL-MCNC: 4.5 G/DL (ref 3.5–5.2)
ALBUMIN UR-MCNC: <1.2 MG/DL
ALBUMIN/GLOB SERPL: 2.4 G/DL
ALP SERPL-CCNC: 80 U/L (ref 39–117)
ALT SERPL W P-5'-P-CCNC: 15 U/L (ref 1–41)
ANION GAP SERPL CALCULATED.3IONS-SCNC: 10 MMOL/L (ref 5–15)
AST SERPL-CCNC: 18 U/L (ref 1–40)
BILIRUB SERPL-MCNC: 0.6 MG/DL (ref 0–1.2)
BUN SERPL-MCNC: 10 MG/DL (ref 6–20)
BUN/CREAT SERPL: 9.5 (ref 7–25)
CALCIUM SPEC-SCNC: 9.7 MG/DL (ref 8.6–10.5)
CHLORIDE SERPL-SCNC: 104 MMOL/L (ref 98–107)
CHOLEST SERPL-MCNC: 173 MG/DL (ref 0–200)
CO2 SERPL-SCNC: 27 MMOL/L (ref 22–29)
CREAT SERPL-MCNC: 1.05 MG/DL (ref 0.76–1.27)
CREAT UR-MCNC: 68.6 MG/DL
EGFRCR SERPLBLD CKD-EPI 2021: 91.5 ML/MIN/1.73
EXPIRATION DATE: ABNORMAL
EXPIRATION DATE: ABNORMAL
GLOBULIN UR ELPH-MCNC: 1.9 GM/DL
GLUCOSE BLDC GLUCOMTR-MCNC: 151 MG/DL (ref 70–130)
GLUCOSE SERPL-MCNC: 131 MG/DL (ref 65–99)
HBA1C MFR BLD: 6.5 % (ref 4.5–5.7)
HDLC SERPL-MCNC: 68 MG/DL (ref 40–60)
LDLC SERPL CALC-MCNC: 93 MG/DL (ref 0–100)
LDLC/HDLC SERPL: 1.37 {RATIO}
Lab: ABNORMAL
Lab: ABNORMAL
MICROALBUMIN/CREAT UR: NORMAL MG/G{CREAT}
POTASSIUM SERPL-SCNC: 4.1 MMOL/L (ref 3.5–5.2)
PROT SERPL-MCNC: 6.4 G/DL (ref 6–8.5)
SODIUM SERPL-SCNC: 141 MMOL/L (ref 136–145)
TRIGL SERPL-MCNC: 60 MG/DL (ref 0–150)
TSH SERPL DL<=0.05 MIU/L-ACNC: 1.48 UIU/ML (ref 0.27–4.2)
VLDLC SERPL-MCNC: 12 MG/DL (ref 5–40)

## 2024-01-30 PROCEDURE — 84443 ASSAY THYROID STIM HORMONE: CPT | Performed by: PHYSICIAN ASSISTANT

## 2024-01-30 PROCEDURE — 99214 OFFICE O/P EST MOD 30 MIN: CPT | Performed by: PHYSICIAN ASSISTANT

## 2024-01-30 PROCEDURE — 95251 CONT GLUC MNTR ANALYSIS I&R: CPT | Performed by: PHYSICIAN ASSISTANT

## 2024-01-30 PROCEDURE — 82043 UR ALBUMIN QUANTITATIVE: CPT | Performed by: PHYSICIAN ASSISTANT

## 2024-01-30 PROCEDURE — 83036 HEMOGLOBIN GLYCOSYLATED A1C: CPT | Performed by: PHYSICIAN ASSISTANT

## 2024-01-30 PROCEDURE — 80053 COMPREHEN METABOLIC PANEL: CPT | Performed by: PHYSICIAN ASSISTANT

## 2024-01-30 PROCEDURE — 80061 LIPID PANEL: CPT | Performed by: PHYSICIAN ASSISTANT

## 2024-01-30 PROCEDURE — 82570 ASSAY OF URINE CREATININE: CPT | Performed by: PHYSICIAN ASSISTANT

## 2024-01-30 RX ORDER — PROCHLORPERAZINE 25 MG/1
SUPPOSITORY RECTAL
Qty: 9 EACH | Refills: 3 | Status: SHIPPED | OUTPATIENT
Start: 2024-01-30

## 2024-01-30 RX ORDER — INSULIN ASPART 100 [IU]/ML
INJECTION, SOLUTION INTRAVENOUS; SUBCUTANEOUS
Qty: 30 ML | Refills: 5 | Status: SHIPPED | OUTPATIENT
Start: 2024-01-30

## 2024-01-30 RX ORDER — PROCHLORPERAZINE 25 MG/1
1 SUPPOSITORY RECTAL
Qty: 1 EACH | Refills: 3 | Status: SHIPPED | OUTPATIENT
Start: 2024-01-30

## 2024-01-30 NOTE — PROGRESS NOTES
"     Office Note      Date: 2024  Patient Name: Jonas Santiago  MRN: [id number]  : 1980    Chief Complaint   Patient presents with    Diabetes       History of Present Illness:   Jonas Santiago is a 43 y.o. male who presents for follow-up for type 1 diabetes diagnosed in .  He remains on the OmniPod 5 insulin pump with Dexcom G6 continuous glucose monitor.  He reports overall things seem to be going well.  He and his wife started doing new earlier this year and his blood sugars seem to be more stable on this diet plan.  He reports he has had some high outliers but this is typically if he eats fast food.  He denies severe or frequent hypoglycemia.  He is up-to-date on his eye exam he had his appointment in October.  He denies any trouble with his feet today.  We did his foot exam at his appointment in September.  He is fasting today for labs.      Subjective     Review of Systems:   Review of Systems   Constitutional: Negative.    Cardiovascular: Negative.    Gastrointestinal: Negative.    Endocrine: Negative.    Neurological: Negative.        The following portions of the patient's history were reviewed and updated as appropriate: allergies, current medications, past family history, past medical history, past social history, past surgical history, and problem list.    Objective     Vitals:    24 0826   BP: 112/74   Pulse: 70   SpO2: 98%   Weight: 85.7 kg (189 lb)   Height: 185.4 cm (73\")     Body mass index is 24.94 kg/m².    Physical Exam  Vitals reviewed.   Constitutional:       General: He is not in acute distress.     Appearance: Normal appearance.   Neurological:      Mental Status: He is alert.         HEMOGLOBIN A1C  Lab Results   Component Value Date    HGBA1C 6.5 (A) 2024       GLUCOSE  Glucose   Date Value Ref Range Status   2024 151 (A) 70 - 130 mg/dL Final       CMP  Lab Results   Component Value Date    GLUCOSE 105 (H) 10/19/2022    BUN 11 10/19/2022    CREATININE 0.80 " 10/19/2022    BCR 13.8 10/19/2022    K 4.0 10/19/2022    CO2 26.0 10/19/2022    CALCIUM 9.6 10/19/2022    AST 19 10/19/2022    ALT 17 10/19/2022       LIPID PANEL  Lab Results   Component Value Date    CHOL 181 10/19/2022    TRIG 59 10/19/2022    HDL 83 (H) 10/19/2022    LDL 87 10/19/2022       URINE MICROALBUMIN/CREATININE RATIO  Microalbumin/Creatinine Ratio   Date Value Ref Range Status   10/19/2022 12.6 mg/g Final       TSH  Lab Results   Component Value Date    TSH 1.300 10/19/2022       Assessment / Plan      Assessment & Plan:  1. Type 1 diabetes mellitus with hyperglycemia  His hemoglobin A1c today is up slightly as compared to September but still to goal at 6.5%.  I reviewed his OmniPod download and overall his blood sugars look pretty good.  His average glucose for the last 2 weeks has been 144 mg/dL with 82% within target range, 17% high, 1% very high and 0% low or very low.  We did not make any changes to his pump settings today.  His weight is up 5 pounds since his appointment in September.  I encouraged continued healthy eating habits and physical activity as tolerated.  Fasting labs pending today.  Will send note with results and plan.  I refilled his Dexcom supplies as well as his insulin today.  - POC Glucose, Blood  - POC Glycosylated Hemoglobin (Hb A1C)  - Insulin Aspart (NovoLOG) 100 UNIT/ML injection; Use as directed in insulin pump, max dose 85 units daily  Dispense: 30 mL; Refill: 5  - Continuous Blood Gluc Transmit (Dexcom G6 Transmitter) misc; Use 1 each Every 3 (Three) Months.  Dispense: 1 each; Refill: 3  - Continuous Blood Gluc Sensor (Dexcom G6 Sensor); Use 1 sensor for 10 days  Dispense: 9 each; Refill: 3  - Comprehensive Metabolic Panel; Future  - Lipid Panel; Future  - Microalbumin / Creatinine Urine Ratio - Urine, Clean Catch; Future  - TSH; Future    2. Benign essential hypertension  His blood pressure is okay today.  He will continue lisinopril 10 mg daily.    3. Presence of  insulin pump        Return in about 4 months (around 5/30/2024) for Recheck.     This note was dictated using Dragon voice recognition.    Shiloh Childers PA-C  01/30/2024

## 2024-04-25 RX ORDER — LISINOPRIL 10 MG/1
10 TABLET ORAL DAILY
Qty: 90 TABLET | Refills: 1 | Status: SHIPPED | OUTPATIENT
Start: 2024-04-25

## 2024-05-30 ENCOUNTER — OFFICE VISIT (OUTPATIENT)
Dept: ENDOCRINOLOGY | Facility: CLINIC | Age: 44
End: 2024-05-30
Payer: COMMERCIAL

## 2024-05-30 VITALS
SYSTOLIC BLOOD PRESSURE: 122 MMHG | DIASTOLIC BLOOD PRESSURE: 74 MMHG | BODY MASS INDEX: 23.86 KG/M2 | WEIGHT: 180 LBS | HEART RATE: 69 BPM | HEIGHT: 73 IN | OXYGEN SATURATION: 98 %

## 2024-05-30 DIAGNOSIS — E10.65 TYPE 1 DIABETES MELLITUS WITH HYPERGLYCEMIA: Primary | ICD-10-CM

## 2024-05-30 DIAGNOSIS — Z96.41 PRESENCE OF INSULIN PUMP: ICD-10-CM

## 2024-05-30 DIAGNOSIS — I10 BENIGN ESSENTIAL HYPERTENSION: ICD-10-CM

## 2024-05-30 LAB
EXPIRATION DATE: ABNORMAL
EXPIRATION DATE: NORMAL
GLUCOSE BLDC GLUCOMTR-MCNC: 129 MG/DL (ref 70–130)
HBA1C MFR BLD: 6.2 % (ref 4.5–5.7)
Lab: ABNORMAL
Lab: NORMAL

## 2024-05-30 RX ORDER — INSULIN PMP CART,AUT,G6/7,CNTR
1 EACH SUBCUTANEOUS
Qty: 30 EACH | Refills: 3 | Status: SHIPPED | OUTPATIENT
Start: 2024-05-30

## 2024-05-30 NOTE — PROGRESS NOTES
"     Office Note      Date: 2024  Patient Name: Jonas Santiago  MRN: [id number]  : 1980    Chief Complaint   Patient presents with    Diabetes       History of Present Illness:   Jonas Santiago is a 44 y.o. male who presents for follow-up for type 1 diabetes diagnosed in .  He remains on the OmniPod 5 insulin pump with Dexcom G6 continuous glucose monitor.  His wife continues to do the [employer] program for weight loss and he has lost weight and eating what she is eating.  He reports overall his blood sugars seem to be okay.  His pump and sensor seem to be functioning with no major issues.  He is up-to-date on his eye exam he goes annually in October.  He denies any trouble with his feet today.  He will be due for his foot exam next visit.  We did fasting labs at his appointment in January.      Subjective     Review of Systems:   Review of Systems   Constitutional: Negative.    Cardiovascular: Negative.    Gastrointestinal: Negative.    Endocrine: Negative.    Neurological: Negative.        The following portions of the patient's history were reviewed and updated as appropriate: allergies, current medications, past family history, past medical history, past social history, past surgical history, and problem list.    Objective     Vitals:    24 0945   BP: 122/74   BP Location: Left arm   Patient Position: Sitting   Cuff Size: Adult   Pulse: 69   SpO2: 98%   Weight: 81.6 kg (180 lb)   Height: 185.4 cm (73\")     Body mass index is 23.75 kg/m².    Physical Exam  Vitals reviewed.   Constitutional:       General: He is not in acute distress.     Appearance: Normal appearance.   Neurological:      Mental Status: He is alert.         HEMOGLOBIN A1C  Lab Results   Component Value Date    HGBA1C 6.2 (A) 2024       GLUCOSE  Glucose   Date Value Ref Range Status   2024 129 70 - 130 mg/dL Final       CMP  Lab Results   Component Value Date    GLUCOSE 131 (H) 2024    BUN 10 2024    " CREATININE 1.05 01/30/2024    BCR 9.5 01/30/2024    K 4.1 01/30/2024    CO2 27.0 01/30/2024    CALCIUM 9.7 01/30/2024    AST 18 01/30/2024    ALT 15 01/30/2024       LIPID PANEL  Lab Results   Component Value Date    CHOL 173 01/30/2024    TRIG 60 01/30/2024    HDL 68 (H) 01/30/2024    LDL 93 01/30/2024       URINE MICROALBUMIN/CREATININE RATIO  Microalbumin/Creatinine Ratio   Date Value Ref Range Status   01/30/2024   Final     Comment:     Unable to calculate       TSH  Lab Results   Component Value Date    TSH 1.480 01/30/2024       Assessment / Plan      Assessment & Plan:  1. Type 1 diabetes mellitus with hyperglycemia  His hemoglobin A1c today is excellent at 6.2%.  I reviewed his Dexcom download with continuous glucose monitor readings.  His average glucose is 146 mg/dL with 81% of his readings within target range, 17% high, 1% very high, 1% low and 0% very low.  Overall his blood sugars look pretty good.  He does have some higher readings during the day.  We discussed changing his blood glucose correction threshold from 150 to 130 mg/dL help improve elevated blood sugars.  His weight is down 9 pounds since his appointment in January.  I encouraged continued healthy eating habits and physical activity as tolerated.  We will plan to do his foot exam next visit for monitoring.  - POC Glucose, Blood  - POC Glycosylated Hemoglobin (Hb A1C)    2. Benign essential hypertension  His blood pressure is okay today.  He will continue lisinopril 10 mg daily.    3. Presence of insulin pump        Return in about 4 months (around 9/30/2024) for Recheck.     This note was dictated using Dragon voice recognition.    Electronically signed by: CJ Wood  05/30/2024

## 2024-07-16 DIAGNOSIS — E10.65 TYPE 1 DIABETES MELLITUS WITH HYPERGLYCEMIA: ICD-10-CM

## 2024-07-16 RX ORDER — INSULIN ASPART 100 [IU]/ML
INJECTION, SOLUTION INTRAVENOUS; SUBCUTANEOUS
Qty: 30 ML | Refills: 3 | Status: SHIPPED | OUTPATIENT
Start: 2024-07-16 | End: 2024-07-18

## 2024-07-16 RX ORDER — PROCHLORPERAZINE 25 MG/1
1 SUPPOSITORY RECTAL
Qty: 1 EACH | Refills: 3 | Status: SHIPPED | OUTPATIENT
Start: 2024-07-16

## 2024-07-16 RX ORDER — PROCHLORPERAZINE 25 MG/1
SUPPOSITORY RECTAL
Qty: 9 EACH | Refills: 3 | Status: SHIPPED | OUTPATIENT
Start: 2024-07-16

## 2024-07-16 RX ORDER — PROCHLORPERAZINE 25 MG/1
1 SUPPOSITORY RECTAL
Qty: 1 EACH | Refills: 3 | Status: CANCELLED | OUTPATIENT
Start: 2024-07-16

## 2024-07-16 RX ORDER — INSULIN ASPART 100 [IU]/ML
INJECTION, SOLUTION INTRAVENOUS; SUBCUTANEOUS
Qty: 30 ML | Refills: 3 | Status: CANCELLED | OUTPATIENT
Start: 2024-07-16

## 2024-07-16 NOTE — TELEPHONE ENCOUNTER
Patient called office, stated that he changed insurance and no longer has [employer]. He now has [employer], ID# [id number], Group# [id number]. He is needing all of his scripts now sent to "[facility]". He is needing a refill on his Dexcom sensors and transmitter, as well as novolog.

## 2024-07-16 NOTE — TELEPHONE ENCOUNTER
[facility] IS REQUESTING A 90 DAY SUPPLY FOR Continuous Glucose Transmitter (Dexcom G6 Transmitter) misc

## 2024-07-16 NOTE — TELEPHONE ENCOUNTER
Rx Refill Note  Requested Prescriptions     Pending Prescriptions Disp Refills    Insulin Aspart (NovoLOG) 100 UNIT/ML injection 30 mL 3     Sig: Use as directed in insulin pump, max dose 85 units daily    Continuous Glucose Transmitter (Dexcom G6 Transmitter) misc 1 each 3     Sig: Use 1 each Every 3 (Three) Months.    Continuous Glucose Sensor (Dexcom G6 Sensor) 9 each 3     Sig: Use 1 sensor for 10 days      Last office visit with prescribing clinician: 5/30/2024     Next office visit with prescribing clinician: 10/8/2024                           Jelena Martinez MA  07/16/24, 09:03 EDT

## 2024-07-18 ENCOUNTER — TELEPHONE (OUTPATIENT)
Dept: ENDOCRINOLOGY | Facility: CLINIC | Age: 44
End: 2024-07-18
Payer: COMMERCIAL

## 2024-07-18 RX ORDER — INSULIN PMP CART,AUT,G6/7,CNTR
1 EACH SUBCUTANEOUS
Qty: 30 EACH | Refills: 3 | Status: SHIPPED | OUTPATIENT
Start: 2024-07-18

## 2024-07-18 RX ORDER — INSULIN LISPRO 100 [IU]/ML
INJECTION, SOLUTION INTRAVENOUS; SUBCUTANEOUS
Qty: 80 ML | Refills: 3 | Status: SHIPPED | OUTPATIENT
Start: 2024-07-18

## 2024-07-18 NOTE — TELEPHONE ENCOUNTER
I signed a prescription for this this morning to fax.  I just sent the prescription electronically as well-- please make patient aware, thanks RT   see L&D report

## 2024-07-18 NOTE — TELEPHONE ENCOUNTER
[facility] CALLED STATING NOVOLOG IS NOT COVERED, BUT HUMALOG IS. RX WAS CLOSED. THEY REQUESTED HUMALOG RX BE SENT.

## 2024-07-19 ENCOUNTER — TELEPHONE (OUTPATIENT)
Dept: ENDOCRINOLOGY | Facility: CLINIC | Age: 44
End: 2024-07-19
Payer: COMMERCIAL

## 2024-07-24 ENCOUNTER — PRIOR AUTHORIZATION (OUTPATIENT)
Dept: ENDOCRINOLOGY | Facility: CLINIC | Age: 44
End: 2024-07-24
Payer: COMMERCIAL

## 2024-10-08 ENCOUNTER — OFFICE VISIT (OUTPATIENT)
Dept: ENDOCRINOLOGY | Facility: CLINIC | Age: 44
End: 2024-10-08
Payer: COMMERCIAL

## 2024-10-08 VITALS
HEIGHT: 73 IN | SYSTOLIC BLOOD PRESSURE: 112 MMHG | WEIGHT: 184 LBS | BODY MASS INDEX: 24.39 KG/M2 | DIASTOLIC BLOOD PRESSURE: 78 MMHG | OXYGEN SATURATION: 98 % | HEART RATE: 70 BPM

## 2024-10-08 DIAGNOSIS — I10 BENIGN ESSENTIAL HYPERTENSION: Chronic | ICD-10-CM

## 2024-10-08 DIAGNOSIS — E10.65 TYPE 1 DIABETES MELLITUS WITH HYPERGLYCEMIA: Primary | ICD-10-CM

## 2024-10-08 DIAGNOSIS — Z96.41 PRESENCE OF INSULIN PUMP: ICD-10-CM

## 2024-10-08 LAB
EXPIRATION DATE: ABNORMAL
EXPIRATION DATE: ABNORMAL
GLUCOSE BLDC GLUCOMTR-MCNC: 143 MG/DL (ref 70–130)
HBA1C MFR BLD: 6.6 % (ref 4.5–5.7)
Lab: ABNORMAL
Lab: ABNORMAL

## 2024-10-08 RX ORDER — LISINOPRIL 10 MG/1
10 TABLET ORAL DAILY
Qty: 90 TABLET | Refills: 2 | Status: SHIPPED | OUTPATIENT
Start: 2024-10-08

## 2024-10-08 NOTE — PROGRESS NOTES
"     Office Note      Date: 10/08/2024  Patient Name: Jonas Santiago  MRN: [id number]  : 1980    Chief Complaint   Patient presents with    Diabetes     Type I       History of Present Illness:   Jonas Santiago is a 44 y.o. male who presents for follow-up for type 1 diabetes diagnosed in .  He continues to use OmniPod 5 with the Dexcom G6 continuous glucose monitor.  His insurance changed in July and he had to switch from NovoLog to HumStaxxon.  He reports overall things seem to be going well with no major issues.  He reports he does have some higher blood glucose readings during the day because he often snacks since he works from home.  He denies any severe or frequent hypoglycemia.  He is starting a new job in the next few weeks and will have new insurance.  He reports he will need prescription sent to a different pharmacy but will reach out and let us know where these need to go.  He will be due for his eye exam in the next few months.  He denies any trouble with his feet today.  We will plan to do fasting labs as well as his foot exam next visit for monitoring.      Subjective     Review of Systems:   Review of Systems   Constitutional: Negative.    Cardiovascular: Negative.    Gastrointestinal: Negative.    Endocrine: Negative.    Neurological: Negative.        The following portions of the patient's history were reviewed and updated as appropriate: allergies, current medications, past family history, past medical history, past social history, past surgical history, and problem list.    Objective     Vitals:    10/08/24 0923   BP: 112/78   BP Location: Left arm   Patient Position: Sitting   Cuff Size: Adult   Pulse: 70   SpO2: 98%   Weight: 83.5 kg (184 lb)   Height: 185.4 cm (73\")     Body mass index is 24.28 kg/m².    Physical Exam  Vitals reviewed.   Constitutional:       General: He is not in acute distress.     Appearance: Normal appearance.   Neurological:      Mental Status: He is alert. "         HEMOGLOBIN A1C  Lab Results   Component Value Date    HGBA1C 6.6 (A) 10/08/2024       GLUCOSE  Glucose   Date Value Ref Range Status   10/08/2024 143 (A) 70 - 130 mg/dL Final       CMP  Lab Results   Component Value Date    GLUCOSE 131 (H) 01/30/2024    BUN 10 01/30/2024    CREATININE 1.05 01/30/2024    BCR 9.5 01/30/2024    K 4.1 01/30/2024    CO2 27.0 01/30/2024    CALCIUM 9.7 01/30/2024    AST 18 01/30/2024    ALT 15 01/30/2024       LIPID PANEL  Lab Results   Component Value Date    CHOL 173 01/30/2024    TRIG 60 01/30/2024    HDL 68 (H) 01/30/2024    LDL 93 01/30/2024       URINE MICROALBUMIN/CREATININE RATIO  Microalbumin/Creatinine Ratio   Date Value Ref Range Status   01/30/2024   Final     Comment:     Unable to calculate       TSH  Lab Results   Component Value Date    TSH 1.480 01/30/2024       Assessment / Plan      Assessment & Plan:  1. Type 1 diabetes mellitus with hyperglycemia  His hemoglobin A1c is up as compared to May but still to goal at 6.6%.  I reviewed his OmniPod download with continuous glucose monitor readings his average glucose is 150 mg/dL with 78% of his readings within target range, 19% high, 2% very high, 1% low and 0% very low.  We reviewed his pump settings he does have some higher blood glucose readings during the day we changed his correction threshold from 150 to 130 mg/dL to try to prevent the hyperglycemia during the day.  He will reach out if he has any trouble.  I gave him a sample Dexcom G6 continuous glucose monitor today in case there are any hiccups with the switch in insurance.  He will reach out to us and let us know where we need to send his prescriptions.  His weight is up 4 pounds since his appointment in May.  I encouraged healthy eating habits and physical activity as tolerated.  He is no longer following the [employer] plan as closely as he was before.  We will plan to do fasting labs as well as his foot exam next visit for monitoring.  - POC Glucose, Blood  -  POC Glycosylated Hemoglobin (Hb A1C)    2. Benign essential hypertension  His blood pressure is excellent today.  He will continue lisinopril 10 mg daily.  I refilled this prescription today.    3. Presence of insulin pump  We discussed the new updates coming up with the OmniPod and the iPhone darwin.  He prefers to do the iPhone darwin instead of updating to the G7.      Return in about 4 months (around 2/8/2025) for Recheck, fasting.     This note was dictated using Dragon voice recognition.    Electronically signed by: CJ Wood  10/08/2024

## 2024-11-25 DIAGNOSIS — E10.65 TYPE 1 DIABETES MELLITUS WITH HYPERGLYCEMIA: ICD-10-CM

## 2024-11-25 RX ORDER — LISINOPRIL 10 MG/1
10 TABLET ORAL DAILY
Qty: 90 TABLET | Refills: 1 | Status: SHIPPED | OUTPATIENT
Start: 2024-11-25

## 2024-11-25 RX ORDER — INSULIN LISPRO 100 [IU]/ML
INJECTION, SOLUTION INTRAVENOUS; SUBCUTANEOUS
Qty: 80 ML | Refills: 1 | Status: SHIPPED | OUTPATIENT
Start: 2024-11-25

## 2024-11-25 RX ORDER — INSULIN PMP CART,AUT,G6/7,CNTR
1 EACH SUBCUTANEOUS
Qty: 30 EACH | Refills: 3 | Status: SHIPPED | OUTPATIENT
Start: 2024-11-25

## 2024-11-25 RX ORDER — PROCHLORPERAZINE 25 MG/1
1 SUPPOSITORY RECTAL
Qty: 1 EACH | Refills: 3 | Status: SHIPPED | OUTPATIENT
Start: 2024-11-25

## 2024-11-25 RX ORDER — PROCHLORPERAZINE 25 MG/1
SUPPOSITORY RECTAL
Qty: 9 EACH | Refills: 3 | Status: SHIPPED | OUTPATIENT
Start: 2024-11-25

## 2024-11-25 NOTE — TELEPHONE ENCOUNTER
Patient is calling requesting all of his medications be sent to [facility].     Patient has recently changed insurance and now uses [facility].

## 2025-01-20 ENCOUNTER — PRIOR AUTHORIZATION (OUTPATIENT)
Dept: ENDOCRINOLOGY | Facility: CLINIC | Age: 45
End: 2025-01-20
Payer: COMMERCIAL

## 2025-01-20 NOTE — TELEPHONE ENCOUNTER
MAG JOHNSTON (Key: [id number])  PA Case ID #: [id number]  Rx #: [id number]  Need Help? Call us at [phone]  Outcome  [facility]  Your PA request has been approved. Additional information will be provided in the approval communication. (Message 1146)  Authorization Expiration Date: 1/19/2026  Drug  Omnipod 5 [id number] Pods Gen 5  ePA cloud logo  Form  Carlito Espinoza PA Form (2017 [employer])

## 2025-01-29 RX ORDER — LISINOPRIL 10 MG/1
10 TABLET ORAL DAILY
Qty: 90 TABLET | Refills: 0 | Status: SHIPPED | OUTPATIENT
Start: 2025-01-29

## 2025-01-29 NOTE — TELEPHONE ENCOUNTER
Patient is calling in today requesting a refill on Lisinopril 10 mg tablets.  He states that he is completely out as of today.  He is asking that it be sent to his local pharmacy instead of mail order.  He would like it sent to [facility] in [place], [place] [phone]

## 2025-02-18 ENCOUNTER — OFFICE VISIT (OUTPATIENT)
Dept: ENDOCRINOLOGY | Facility: CLINIC | Age: 45
End: 2025-02-18
Payer: COMMERCIAL

## 2025-02-18 VITALS
DIASTOLIC BLOOD PRESSURE: 75 MMHG | SYSTOLIC BLOOD PRESSURE: 114 MMHG | WEIGHT: 190 LBS | HEIGHT: 72 IN | OXYGEN SATURATION: 98 % | BODY MASS INDEX: 25.73 KG/M2 | HEART RATE: 75 BPM

## 2025-02-18 DIAGNOSIS — Z96.41 PRESENCE OF INSULIN PUMP: ICD-10-CM

## 2025-02-18 DIAGNOSIS — E10.65 TYPE 1 DIABETES MELLITUS WITH HYPERGLYCEMIA: Primary | ICD-10-CM

## 2025-02-18 DIAGNOSIS — I10 BENIGN ESSENTIAL HYPERTENSION: ICD-10-CM

## 2025-02-18 LAB
ALBUMIN SERPL-MCNC: 3.7 G/DL (ref 3.5–5.2)
ALBUMIN UR-MCNC: <1.2 MG/DL
ALBUMIN/GLOB SERPL: 1.3 G/DL
ALP SERPL-CCNC: 77 U/L (ref 39–117)
ALT SERPL W P-5'-P-CCNC: 20 U/L (ref 1–41)
ANION GAP SERPL CALCULATED.3IONS-SCNC: 7.2 MMOL/L (ref 5–15)
AST SERPL-CCNC: 22 U/L (ref 1–40)
BILIRUB SERPL-MCNC: 0.5 MG/DL (ref 0–1.2)
BUN SERPL-MCNC: 9 MG/DL (ref 6–20)
BUN/CREAT SERPL: 9.1 (ref 7–25)
CALCIUM SPEC-SCNC: 9.5 MG/DL (ref 8.6–10.5)
CHLORIDE SERPL-SCNC: 105 MMOL/L (ref 98–107)
CHOLEST SERPL-MCNC: 170 MG/DL (ref 0–200)
CO2 SERPL-SCNC: 27.8 MMOL/L (ref 22–29)
CREAT SERPL-MCNC: 0.99 MG/DL (ref 0.76–1.27)
CREAT UR-MCNC: 18.7 MG/DL
EGFRCR SERPLBLD CKD-EPI 2021: 97.5 ML/MIN/1.73
EXPIRATION DATE: ABNORMAL
EXPIRATION DATE: NORMAL
GLOBULIN UR ELPH-MCNC: 2.9 GM/DL
GLUCOSE BLDC GLUCOMTR-MCNC: 123 MG/DL (ref 70–130)
GLUCOSE SERPL-MCNC: 112 MG/DL (ref 65–99)
HBA1C MFR BLD: 6.5 % (ref 4.5–5.7)
HDLC SERPL-MCNC: 74 MG/DL (ref 40–60)
LDLC SERPL CALC-MCNC: 85 MG/DL (ref 0–100)
LDLC/HDLC SERPL: 1.15 {RATIO}
Lab: ABNORMAL
Lab: NORMAL
MICROALBUMIN/CREAT UR: NORMAL MG/G{CREAT}
POTASSIUM SERPL-SCNC: 3.6 MMOL/L (ref 3.5–5.2)
PROT SERPL-MCNC: 6.6 G/DL (ref 6–8.5)
SODIUM SERPL-SCNC: 140 MMOL/L (ref 136–145)
TRIGL SERPL-MCNC: 54 MG/DL (ref 0–150)
TSH SERPL DL<=0.05 MIU/L-ACNC: 2.1 UIU/ML (ref 0.27–4.2)
VLDLC SERPL-MCNC: 11 MG/DL (ref 5–40)

## 2025-02-18 PROCEDURE — 95251 CONT GLUC MNTR ANALYSIS I&R: CPT | Performed by: PHYSICIAN ASSISTANT

## 2025-02-18 PROCEDURE — 36415 COLL VENOUS BLD VENIPUNCTURE: CPT | Performed by: PHYSICIAN ASSISTANT

## 2025-02-18 PROCEDURE — 80061 LIPID PANEL: CPT | Performed by: PHYSICIAN ASSISTANT

## 2025-02-18 PROCEDURE — 99214 OFFICE O/P EST MOD 30 MIN: CPT | Performed by: PHYSICIAN ASSISTANT

## 2025-02-18 PROCEDURE — 80053 COMPREHEN METABOLIC PANEL: CPT | Performed by: PHYSICIAN ASSISTANT

## 2025-02-18 PROCEDURE — 84443 ASSAY THYROID STIM HORMONE: CPT | Performed by: PHYSICIAN ASSISTANT

## 2025-02-18 PROCEDURE — 82043 UR ALBUMIN QUANTITATIVE: CPT | Performed by: PHYSICIAN ASSISTANT

## 2025-02-18 PROCEDURE — 83036 HEMOGLOBIN GLYCOSYLATED A1C: CPT | Performed by: PHYSICIAN ASSISTANT

## 2025-02-18 PROCEDURE — 82570 ASSAY OF URINE CREATININE: CPT | Performed by: PHYSICIAN ASSISTANT

## 2025-02-18 RX ORDER — INSULIN LISPRO 100 [IU]/ML
INJECTION, SOLUTION INTRAVENOUS; SUBCUTANEOUS
Qty: 80 ML | Refills: 2 | Status: SHIPPED | OUTPATIENT
Start: 2025-02-18

## 2025-02-18 NOTE — PROGRESS NOTES
"     Office Note      Date: 2025  Patient Name: Jonas Santiago  MRN: [id number]  : 1980    Chief Complaint   Patient presents with    Diabetes       History of Present Illness:   Jonas Santiago is a 44 y.o. male who presents for follow-up for type 1 diabetes diagnosed in .  He remains on the OmniPod insulin pump with Dexcom G6 continuous glucose monitor.  He is using the Apple iPhone darwin and has enjoyed using this.  He reports overall things seem to be going well.  He reports he has noted he is using more insulin recently but his stress level has been high.  He reports he has had some trouble recently with his pump and sensor staying connected but his sensor is on his left arm and his pod is on his right arm.  When he changes his pod tomorrow he plans to put it back on the left arm.  He denies any severe or frequent hypoglycemia.  He is up-to-date on his eye exam he had his appointment in the fall and all was okay.   He is fasting today for labs and is due for his foot exam.      Subjective     Review of Systems:   Review of Systems   Constitutional: Negative.    Cardiovascular: Negative.    Gastrointestinal: Negative.    Endocrine: Negative.    Neurological: Negative.        The following portions of the patient's history were reviewed and updated as appropriate: allergies, current medications, past family history, past medical history, past social history, past surgical history, and problem list.    Objective     Vitals:    25 0812   BP: 114/75   Pulse: 75   SpO2: 98%   Weight: 86.2 kg (190 lb)   Height: 182.9 cm (72\")     Body mass index is 25.77 kg/m².    Physical Exam  Vitals reviewed.   Constitutional:       General: He is not in acute distress.     Appearance: Normal appearance.   Cardiovascular:      Pulses:           Dorsalis pedis pulses are 2+ on the right side and 2+ on the left side.        Posterior tibial pulses are 2+ on the right side and 2+ on the left side.   Musculoskeletal: "      Right foot: No deformity.      Left foot: No deformity.   Feet:      Right foot:      Protective Sensation: 5 sites tested.  5 sites sensed.      Skin integrity: Callus and dry skin present.      Toenail Condition: Right toenails are normal.      Left foot:      Protective Sensation: 5 sites tested.  5 sites sensed.      Skin integrity: Callus and dry skin present.      Toenail Condition: Left toenails are normal.      Comments: Diabetic Foot Exam Performed and Monofilament Test Performed      Neurological:      Mental Status: He is alert.         HEMOGLOBIN A1C  Lab Results   Component Value Date    HGBA1C 6.5 (A) 02/18/2025       GLUCOSE  Glucose   Date Value Ref Range Status   02/18/2025 123 70 - 130 mg/dL Final       CMP  Lab Results   Component Value Date    GLUCOSE 131 (H) 01/30/2024    BUN 10 01/30/2024    CREATININE 1.05 01/30/2024    BCR 9.5 01/30/2024    K 4.1 01/30/2024    CO2 27.0 01/30/2024    CALCIUM 9.7 01/30/2024    AST 18 01/30/2024    ALT 15 01/30/2024       LIPID PANEL  Lab Results   Component Value Date    CHOL 173 01/30/2024    TRIG 60 01/30/2024    HDL 68 (H) 01/30/2024    LDL 93 01/30/2024       URINE MICROALBUMIN/CREATININE RATIO  Microalbumin/Creatinine Ratio   Date Value Ref Range Status   01/30/2024   Final     Comment:     Unable to calculate       TSH  Lab Results   Component Value Date    TSH 1.480 01/30/2024       Assessment / Plan      Assessment & Plan:  1. Type 1 diabetes mellitus with hyperglycemia  His hemoglobin A1c has improved as compared to October at 6.5%.  I congratulated him on his efforts.  I reviewed his OmniPod download.  His average glucose for the last 2 weeks is 147 mg/dL with 78% of his readings within target range, 19% high, 2% very high, 1% low and 0% very low.  I did not make any changes to his pump settings today.  I did refill his Humalog today.  His weight is up 6 pounds since his appointment in October.  I encouraged healthy eating habits and physical  activity as tolerated.  His foot exam showed dry skin today with mild erythema.  There was no sign of infection..  We discussed using lotion (Vanicream or Aquaphor) more regularly.  Fasting labs pending today.  Will send note with results and plan.  - POC Glucose, Blood  - POC Glycosylated Hemoglobin (Hb A1C)  - TSH; Future  - Comprehensive Metabolic Panel; Future  - Lipid Panel; Future  - Microalbumin / Creatinine Urine Ratio - Urine, Clean Catch; Future    2. Benign essential hypertension  His blood pressure is excellent today.  He will continue his lisinopril.    3. Presence of insulin pump        Return in about 4 months (around 6/18/2025).     This note was dictated using Dragon voice recognition.    Electronically signed by: CJ Wood  02/18/2025

## 2025-03-12 ENCOUNTER — TELEPHONE (OUTPATIENT)
Dept: ENDOCRINOLOGY | Facility: CLINIC | Age: 45
End: 2025-03-12

## 2025-03-12 ENCOUNTER — PRIOR AUTHORIZATION (OUTPATIENT)
Dept: ENDOCRINOLOGY | Facility: CLINIC | Age: 45
End: 2025-03-12
Payer: COMMERCIAL

## 2025-03-12 NOTE — TELEPHONE ENCOUNTER
[facility] CALLED CHECKING STATUS OF PRIOR AUTH FOR [employer]. THEY FAXED A REQUEST BUT IT WAS THE WRONG FAX NUMBER. THEY ARE GOING TO REFAX THE REQUEST TO OUR CURRENT FAX NUMBER. THEY GAVE A PHONE NUMBER TO CALL TO GET PRIOR AUTH. PHONE NUMBER [phone]

## 2025-03-12 NOTE — TELEPHONE ENCOUNTER
MAG JOHNSTON (Key: [id number])  PA Case ID #: [id number]  Need Help? Call us at [phone]  Outcome  Approved today by [facility]  Your PA request has been approved. Additional information will be provided in the approval communication. (Message 1147)  Effective Date: 3/12/2025  Authorization Expiration Date: 3/12/2026  Drug  HumaLOG 100UNIT/ML solution  ePA cloud logo  Form  Carlito Electronic PA Form (2017 [employer])

## 2025-04-23 RX ORDER — LISINOPRIL 10 MG/1
10 TABLET ORAL DAILY
Qty: 90 TABLET | Refills: 1 | Status: SHIPPED | OUTPATIENT
Start: 2025-04-23